# Patient Record
Sex: FEMALE | URBAN - METROPOLITAN AREA
[De-identification: names, ages, dates, MRNs, and addresses within clinical notes are randomized per-mention and may not be internally consistent; named-entity substitution may affect disease eponyms.]

---

## 2021-03-16 ENCOUNTER — APPOINTMENT (OUTPATIENT)
Age: 31
Setting detail: DERMATOLOGY
End: 2021-03-16

## 2021-03-16 VITALS — TEMPERATURE: 98.8 F

## 2021-03-16 DIAGNOSIS — L90.0 LICHEN SCLEROSUS ET ATROPHICUS: ICD-10-CM

## 2021-03-16 PROCEDURE — OTHER MIPS QUALITY: OTHER

## 2021-03-16 PROCEDURE — OTHER COUNSELING: OTHER

## 2021-03-16 PROCEDURE — OTHER PRESCRIPTION: OTHER

## 2021-03-16 PROCEDURE — 99203 OFFICE O/P NEW LOW 30 MIN: CPT

## 2021-03-16 PROCEDURE — OTHER TREATMENT REGIMEN: OTHER

## 2021-03-16 RX ORDER — TRIAMCINOLONE ACETONIDE 1 MG/G
CREAM TOPICAL BID
Qty: 1 | Refills: 0 | Status: ERX | COMMUNITY
Start: 2021-03-16

## 2021-03-16 ASSESSMENT — LOCATION DETAILED DESCRIPTION DERM
LOCATION DETAILED: RIGHT MONS PUBIS
LOCATION DETAILED: RIGHT PERINEUM

## 2021-03-16 ASSESSMENT — LOCATION ZONE DERM
LOCATION ZONE: VULVA
LOCATION ZONE: PERINEUM

## 2021-03-16 ASSESSMENT — LOCATION SIMPLE DESCRIPTION DERM
LOCATION SIMPLE: RIGHT PERINEUM
LOCATION SIMPLE: MONS PUBIS

## 2021-03-16 NOTE — PROCEDURE: TREATMENT REGIMEN
Detail Level: Simple
Plan: patient has history of hashimotos disease. states when it is flared it seems like this flares up as well. but has been pretty well controlled recently
Initiate Treatment: triamcinolone acetonide 0.1 % topical cream BID\\nApply sparingly to affected area in groin area bid x 2 weeks/month  (avoid eyes)